# Patient Record
Sex: FEMALE | Race: WHITE | NOT HISPANIC OR LATINO | Employment: STUDENT | URBAN - METROPOLITAN AREA
[De-identification: names, ages, dates, MRNs, and addresses within clinical notes are randomized per-mention and may not be internally consistent; named-entity substitution may affect disease eponyms.]

---
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
## 2024-10-01 ENCOUNTER — NURSE ONLY (OUTPATIENT)
Dept: INTERNAL MEDICINE CLINIC | Facility: HOSPITAL | Age: 19
End: 2024-10-01
Attending: PREVENTIVE MEDICINE

## 2024-10-01 DIAGNOSIS — Z00.00 WELLNESS EXAMINATION: Primary | ICD-10-CM

## 2024-10-01 PROCEDURE — 86480 TB TEST CELL IMMUN MEASURE: CPT

## 2024-10-03 LAB
M TB IFN-G CD4+ T-CELLS BLD-ACNC: 0.02 IU/ML
M TB TUBERC IFN-G BLD QL: NEGATIVE
M TB TUBERC IGNF/MITOGEN IGNF CONTROL: >10 IU/ML
QFT TB1 AG MINUS NIL: 0 IU/ML
QFT TB2 AG MINUS NIL: -0.01 IU/ML

## (undated) DEVICE — 6 ML SYRINGE LUER-LOCK TIP: Brand: MONOJECT

## (undated) DEVICE — UNDYED BRAIDED (POLYGLACTIN 910), SYNTHETIC ABSORBABLE SUTURE: Brand: COATED VICRYL

## (undated) DEVICE — TROCAR: Brand: KII SLEEVE

## (undated) DEVICE — SOL NACL IRRIG 0.9% 1000ML BTL

## (undated) DEVICE — TROCAR: Brand: KII SHIELDED BLADED ACCESS SYSTEM

## (undated) DEVICE — ENCORE® LATEX MICRO SIZE 6.5, STERILE LATEX POWDER-FREE SURGICAL GLOVE: Brand: ENCORE

## (undated) DEVICE — GAMMEX® PI HYBRID SIZE 6.5, STERILE POWDER-FREE SURGICAL GLOVE, POLYISOPRENE AND NEOPRENE BLEND: Brand: GAMMEX

## (undated) DEVICE — FORCEPS BP 33CM 5MM HALO PKS

## (undated) DEVICE — SOLUTION ANTIFOG W/ SPONGE

## (undated) DEVICE — [HIGH FLOW INSUFFLATOR,  DO NOT USE IF PACKAGE IS DAMAGED,  KEEP DRY,  KEEP AWAY FROM SUNLIGHT,  PROTECT FROM HEAT AND RADIOACTIVE SOURCES.]: Brand: PNEUMOSURE

## (undated) DEVICE — LAPAROSCOPY: Brand: MEDLINE INDUSTRIES, INC.

## (undated) DEVICE — SOL  0.9% 1000ML

## (undated) DEVICE — DERMABOND CLOSURE 0.7ML TOPICL

## (undated) DEVICE — INSUFFLATION NEEDLE TO ESTABLISH PNEUMOPERITONEUM.: Brand: INSUFFLATION NEEDLE

## (undated) DEVICE — MANIPULATOR CATH ESCP KRNR

## (undated) NOTE — LETTER
4/26/2023          To Whom It May Concern:    Stacia Naik is currently under my medical care and may not return to school/work at this time. She may return to school/work on 5/1/2023. If you require additional information please contact our office.         Sincerely,    Annie Tesfaye MD

## (undated) NOTE — LETTER
Date & Time: 4/23/2023, 1:17 PM  Patient: Siva Billingsley  Encounter Provider(s):    ANDREIA Lockett       To Whom It May Concern:    Siva Billingsley was seen and treated in our department on 4/23/2023. She should not return to work until 4/26/23 .     If you have any questions or concerns, please do not hesitate to call.        _____________________________  Physician/APC Signature

## (undated) NOTE — LETTER
Date & Time: 4/23/2023, 1:17 PM  Patient: Carlos Hilton  Encounter Provider(s):    ANDREIA Chino       To Whom It May Concern:    Carlos Hilton was seen and treated in our department on 4/23/2023. She {Return to school/sport/work:[id number]}.     If you have any questions or concerns, please do not hesitate to call.        _____________________________  Physician/APC Signature

## (undated) NOTE — LETTER
Date & Time: 4/23/2023, 1:16 PM  Patient: Melisa Broussard  Encounter Provider(s):    ANDREIA Lopez       To Whom It May Concern:    Melisa Broussard was seen and treated in our department on 4/23/2023. She should not return to work until 4/25/23 .     If you have any questions or concerns, please do not hesitate to call.        _____________________________  Physician/APC Signature

## (undated) NOTE — LETTER
Date & Time: 6/23/2023, 10:21 AM  Patient: Tia Singh  Encounter Provider(s):    Adelaide Oates MD       To Whom It May Concern:    Tia Singh was seen and treated in our department on 6/23/2023. She should not return to work until 6/25/23 .     If you have any questions or concerns, please do not hesitate to call.        _____________________________  Physician/APC Signature

## (undated) NOTE — LETTER
Date & Time: 4/8/2022, 2:08 PM  Patient: Mary Valenzuela  Encounter Provider(s):    ANDREIA Duarte       To Whom It May Concern:    Mary Valenzuela was seen and treated in our department on 4/8/2022. She should not return to work until 4/9/2022.     If you have any questions or concerns, please do not hesitate to call.        _____________________________  Physician/APC Signature